# Patient Record
Sex: MALE | Race: WHITE
[De-identification: names, ages, dates, MRNs, and addresses within clinical notes are randomized per-mention and may not be internally consistent; named-entity substitution may affect disease eponyms.]

---

## 2018-03-17 ENCOUNTER — HOSPITAL ENCOUNTER (EMERGENCY)
Dept: HOSPITAL 74 - FER | Age: 23
Discharge: HOME | End: 2018-03-17
Payer: COMMERCIAL

## 2018-03-17 VITALS — SYSTOLIC BLOOD PRESSURE: 138 MMHG | HEART RATE: 70 BPM | TEMPERATURE: 99 F | DIASTOLIC BLOOD PRESSURE: 83 MMHG

## 2018-03-17 VITALS — BODY MASS INDEX: 28.2 KG/M2

## 2018-03-17 DIAGNOSIS — K29.00: Primary | ICD-10-CM

## 2018-03-17 LAB
ALBUMIN SERPL-MCNC: 3.7 G/DL (ref 3.5–5)
ALP SERPL-CCNC: 40 U/L (ref 32–92)
ALT SERPL-CCNC: 20 U/L (ref 10–40)
ANION GAP SERPL CALC-SCNC: 3 MMOL/L (ref 8–16)
AST SERPL-CCNC: 19 U/L (ref 10–42)
BASOPHILS # BLD: 0.6 % (ref 0–2)
BILIRUB SERPL-MCNC: 0.9 MG/DL (ref 0.2–1)
BUN SERPL-MCNC: 11 MG/DL (ref 7–18)
CALCIUM SERPL-MCNC: 8.8 MG/DL (ref 8.4–10.2)
CHLORIDE SERPL-SCNC: 102 MMOL/L (ref 98–107)
CO2 SERPL-SCNC: 30 MMOL/L (ref 22–28)
CREAT SERPL-MCNC: 0.8 MG/DL (ref 0.6–1.3)
DEPRECATED RDW RBC AUTO: 11.8 % (ref 11.9–15.9)
EOSINOPHIL # BLD: 1.7 % (ref 0–4.5)
GLUCOSE SERPL-MCNC: 92 MG/DL (ref 74–106)
HCT VFR BLD CALC: 41.5 % (ref 35.4–49)
HGB BLD-MCNC: 14.1 GM/DL (ref 11.7–16.9)
LYMPHOCYTES # BLD: 20.3 % (ref 8–40)
MCH RBC QN AUTO: 29.2 PG (ref 25.7–33.7)
MCHC RBC AUTO-ENTMCNC: 34.1 G/DL (ref 32–35.9)
MCV RBC: 85.7 FL (ref 80–96)
MONOCYTES # BLD AUTO: 12.3 % (ref 3.8–10.2)
NEUTROPHILS # BLD: 65.1 % (ref 42.8–82.8)
PLATELET # BLD AUTO: 205 K/MM3 (ref 134–434)
PMV BLD: 9.5 FL (ref 7.5–11.1)
POTASSIUM SERPLBLD-SCNC: 3.6 MMOL/L (ref 3.5–5.1)
PROT SERPL-MCNC: 6.4 G/DL (ref 6.4–8.3)
RBC # BLD AUTO: 4.84 M/MM3 (ref 4–5.6)
SODIUM SERPL-SCNC: 135 MMOL/L (ref 136–145)
WBC # BLD AUTO: 6.6 K/MM3 (ref 4–10.8)

## 2018-03-17 PROCEDURE — 3E033GC INTRODUCTION OF OTHER THERAPEUTIC SUBSTANCE INTO PERIPHERAL VEIN, PERCUTANEOUS APPROACH: ICD-10-PCS

## 2018-03-17 NOTE — PDOC
History of Present Illness





- History of Present Illness


Initial Comments: 








03/17/18 19:39


The patient is a 23 year old male, who presents to the emergency department with

, approx one week of epigastric abdominal pain. The patient reports the 

epigastric abdominal pain radiates up to his chest and endorses some chest 

discomfort. The patient states the epigastric abdominal pain is made worse when 

he eats or drinks water, and is alleviated when he is not eating. The patient 

states he was diagnosed with an upper respiratory infection on Monday (6 days 

ago) and has just finished his course of antibiotics. The patient states he has 

been taking Tums and Advil for the chest discomfort with minimal relief. The 

patient also reports one episode of non bloody, non bilious emesis earlier 

today. The patient states he had an EKG performed on Monday which was normal. 





He denies any recent fevers, chills, headache or dizziness. He denies any 

recent diarrhea or constipation. He denies any recent chest pain, palpitations 

or shortness of breath. 





PAST MEDICAL HISTORY:  no significant history





PAST SURGICAL HISTORY:  no significant history





FAMILY HISTORY:  no pertinent history





SOCIAL HISTORY:  Pt lives with  family and is employed.








Review of Systems 


General:  No fevers or chills, no weakness, no weight loss 


HEENT: No change in vision.  No sore throat,. No ear pain


CardioVascular:  (+) Chest discomfort. No chest pain or shortness of breath


Respiratory:No cough, or wheezing. 


Gastrointestinal:  (+) Nausea. (+) Vomiting. (+) Epigastric abdominal pain. No 

diarrhea or constipation,  No rectal bleeding


Genitourinary:  No dysuria, hematuria, or frequency


Musculoskeletal:  No joint or muscle pain or swelling


Neurologic: No headache, vertigo, dizziness or loss of consciousness


Psychiatric: nor depression 


Skin: No rashes or easy bruising


Endocrine: no increased thirst or abnormal weight change


Allergic: no skin or latex allergy


All other systems reviewed and normal





Physical Exam 


General: Well-nourished well-developed individual, no acute distress


HEENT: Throat: (+) Mild erythema to the posterior oropharynx., Tonsils normal, 

no exudate


Neck: Supple, no meningeal signs, no lymphadenopathy


Eyes::Pupils equal reactive and round, extraocular motion intact


Chest: Nontender to palpation 


Cardiac: S1-S2 normal, regular rate and rhythm, no murmurs rubs or gallops


Respiratory: Lungs clear to auscultation bilateral


Abdomen: (+) Mild tenderness to palpation in the epigastric region. Soft, 

nondistended, normal bowel sounds.


Extremities: Warm, dry, no cyanosis, clubbing, or edema


Skin: No rashes


Neuro: Alert and oriented x3, nonfocal exam, grossly intact, normal gait


Psych: Normal mood and affect











<Eder Ortiz - Last Filed: 03/17/18 19:55>





- General


History Source: Patient


Exam Limitations: No Limitations





- History of Present Illness


Initial Comments: 





03/17/18 19:35





A portion of this note was documented by scribe services under my direction. I 

have reviewed the details of the note, within reason, and agree with the 

documentation.  The case summary and management plan written by me. 





Medical decision making this is a 23-year-old male who comes in complaining of 

a burning sensation in his epigastric area radiating to his chest. Patient was 

at primary care center several days ago and diagnosed with a viral upper 

respiratory tract infection and started on azithromycin which she finished. 

Patient also has been taking an excess of anti-inflammatories for the pain 

which is making the pain worse he said. 





Differential diagnosis includes gastric irritation from NSAIDs/gastritis, 

pharyngitis, viral upper respiratory tract infection, pleuritis, costochondritis

, cardiac disease, 


Will obtain workup including EKG, CBC, comp, lipase, chest x-ray and cardiac 

enzymes


Will medicate with antacids including Pepcid and Maalox.


Will reassess and follow-up on the results of the workup.


03/17/18 21:03


Reevaluation patient symptoms are improved with the antacids.





Patient's chest x-ray shows no acute pathology





Patient's EKG is normal





Patient's blood work is normal with the exception of increased monos a Monospot 

was added to rule out infectious mono





Assessment and plan: This is a 23-year-old male with fatigue and epigastric 

pain. Patient has been taking an excess of anti-inflammatories most likely 

contributing to his gastric discomfort. Patient was told to stop the anti-

inflammatories and take some antacids for a couple weeks.  Patient denied any 

symptoms suggestive of a bleed secondary to the gastritis.


In addition to that patient's symptoms and workup is suspicious for infectious 

mono if infectious mono screen is still pending. Patient will call in for the 

result tomorrow morning and will follow-up with his primary care doctor if it 

is positive





Patient discharged home with his father.


03/17/18 21:06








<Betsy Myrick I - Last Filed: 03/17/18 21:08>





- General


Chief Complaint: Respiratory


Stated Complaint: SORE THROAT, DRY COUGH, CHEST PAIN


Time Seen by Provider: 03/17/18 19:16





Past History





<Eder Ortiz - Last Filed: 03/17/18 19:55>





- Past Medical History


COPD: No


Other medical history: DENIES





- Suicide/Smoking/Psychosocial Hx


Smoking History: Never smoked


Hx Alcohol Use: No


Drug/Substance Use Hx: No





<Betsy Myrick - Last Filed: 03/17/18 21:08>





- Past Medical History


Allergies/Adverse Reactions: 


 Allergies











Allergy/AdvReac Type Severity Reaction Status Date / Time


 


No Known Allergies Allergy   Unverified 03/17/18 18:28














*Physical Exam





- Vital Signs


 Last Vital Signs











Temp Pulse Resp BP Pulse Ox


 


 99 F   70   18   138/83   98 


 


 03/17/18 18:10  03/17/18 18:10  03/17/18 18:10  03/17/18 18:10  03/17/18 18:10














<Eder Ortiz - Last Filed: 03/17/18 19:55>





- Vital Signs


 Last Vital Signs











Temp Pulse Resp BP Pulse Ox


 


 99 F   70   18   138/83   98 


 


 03/17/18 18:10  03/17/18 18:10  03/17/18 18:10  03/17/18 18:10  03/17/18 18:10














<Betsy Myrick I - Last Filed: 03/17/18 21:08>





**Heart Score/ECG Review


  ** #1





03/17/18 19:55


Normal Sinus Rhythm at 70 bpm.


QT/QTc at 412/444 ms.


EKG reviewed by Dr. Schwartz.





<Eder Ortiz - Last Filed: 03/17/18 19:55>





ED Treatment Course





- LABORATORY


CBC & Chemistry Diagram: 


 03/17/18 19:40





 03/17/18 19:40





<Eder Ortiz - Last Filed: 03/17/18 19:55>





- LABORATORY


CBC & Chemistry Diagram: 


 03/17/18 19:40





 03/17/18 19:40





<Betsy Myrick - Last Filed: 03/17/18 21:08>





*DC/Admit/Observation/Transfer





- Attestations


Scribe Attestion: 





03/17/18 19:39





Documentation prepared by Eder Ortiz, acting as medical scribe for 

Betsy Myrick MD.





<Eder Ortiz - Last Filed: 03/17/18 19:55>





<Betsy Myrick - Last Filed: 03/17/18 21:08>


Diagnosis at time of Disposition: 


Gastritis


Qualifiers:


 Gastritis type: unspecified gastritis Chronicity: acute Gastritis bleeding: 

presence of bleeding unspecified Qualified Code(s): K29.00 - Acute gastritis 

without bleeding








- Discharge Dispostion


Disposition: HOME


Condition at time of disposition: Stable





- Patient Instructions


Additional Instructions: 





Purchase some over-the-counter antacids such as prilosec and take as directed 

on the box also you can purchase some Tums and Maalox to take as needed for 

symptomatic relief. 








Return to the emergency department immediately with ANY new, persistent or 

worsening symptoms.





Continue any medications as previously prescribed by your physician.





You should follow up with your primary doctor as soon as possible regarding 

today's emergency department visit.


.


Please make sure your doctor reviews the results of your emergency evaluation.





Thank you for coming to the   Emergency Department today for your care. It was 

a pleasure to see you today. Please note that your evaluation is INCOMPLETE 

until you  follow-up with your doctor. 











- Post Discharge Activity


Forms/Work/School Notes:  Back to School

## 2018-03-19 NOTE — EKG
Test Reason : 

Blood Pressure : ***/*** mmHG

Vent. Rate : 070 BPM     Atrial Rate : 070 BPM

   P-R Int : 132 ms          QRS Dur : 108 ms

    QT Int : 412 ms       P-R-T Axes : 037 081 045 degrees

   QTc Int : 444 ms

 

NORMAL SINUS RHYTHM

NORMAL ECG

NO PREVIOUS ECGS AVAILABLE

Confirmed by BALA GOTTI, SALVADOR ([id number]) on 3/19/2018 12:10:27 AM

 

Referred By: VALENTE CALDWELL           Confirmed By:SALVADOR MCKENNA MD